# Patient Record
Sex: MALE | Race: BLACK OR AFRICAN AMERICAN | Employment: FULL TIME | URBAN - METROPOLITAN AREA
[De-identification: names, ages, dates, MRNs, and addresses within clinical notes are randomized per-mention and may not be internally consistent; named-entity substitution may affect disease eponyms.]

---

## 2019-06-28 ENCOUNTER — OFFICE VISIT (OUTPATIENT)
Dept: UROLOGY | Age: 59
End: 2019-06-28

## 2019-06-28 ENCOUNTER — HOSPITAL ENCOUNTER (OUTPATIENT)
Dept: LAB | Age: 59
Discharge: HOME | End: 2019-06-28
Payer: COMMERCIAL

## 2019-06-28 VITALS
DIASTOLIC BLOOD PRESSURE: 86 MMHG | OXYGEN SATURATION: 98 % | BODY MASS INDEX: 25.48 KG/M2 | WEIGHT: 172 LBS | SYSTOLIC BLOOD PRESSURE: 129 MMHG | HEART RATE: 67 BPM | HEIGHT: 69 IN

## 2019-06-28 DIAGNOSIS — Z12.5 PROSTATE CANCER SCREENING: ICD-10-CM

## 2019-06-28 DIAGNOSIS — N50.89 SCROTAL SWELLING: Primary | ICD-10-CM

## 2019-06-28 DIAGNOSIS — N50.89 SCROTAL SWELLING: ICD-10-CM

## 2019-06-28 DIAGNOSIS — N50.82 SCROTAL PAIN: ICD-10-CM

## 2019-06-28 LAB
BILIRUB UR QL STRIP: NEGATIVE
GLUCOSE UR-MCNC: NEGATIVE MG/DL
KETONES P FAST UR STRIP-MCNC: NEGATIVE MG/DL
PH UR STRIP: 7 [PH] (ref 4.6–8)
PROT UR QL STRIP: NEGATIVE
PSA SERPL-MCNC: 0.2 NG/ML (ref 0–4)
SP GR UR STRIP: 1.01 (ref 1–1.03)
UA UROBILINOGEN AMB POC: NORMAL (ref 0.2–1)
URINALYSIS CLARITY POC: CLEAR
URINALYSIS COLOR POC: YELLOW
URINE BLOOD POC: NEGATIVE
URINE LEUKOCYTES POC: NEGATIVE
URINE NITRITES POC: NEGATIVE

## 2019-06-28 PROCEDURE — 84153 ASSAY OF PSA TOTAL: CPT

## 2019-06-28 RX ORDER — BISMUTH SUBSALICYLATE 262 MG
1 TABLET,CHEWABLE ORAL DAILY
COMMUNITY

## 2019-06-28 NOTE — PROGRESS NOTES
Neal Olmos    Chief Complaint   Patient presents with    New Patient    Testicle Swelling    Testicle Pain     tenderness with touch       History and Physical    The patient is a pleasant 69-year-old -American male who comes in with a several week history of some changes in the way his scrotum feels. It is not actual pain but his sensation of thickness and tightness and discomfort when he touches the area. There is no dysuria there is no hematuria although the patient has begun to note nocturia x2 progressing over months. The patient denies any preceding injury and has not had any sexual activity outside of his monogamous relationship. Has never had this issue before. The patient is unaware of any prostate history within the family    Past Medical History:   Diagnosis Date    Arthritis      Patient Active Problem List   Diagnosis Code    Osteoarthritis of left knee M17.12     Past Surgical History:   Procedure Laterality Date    HX COLONOSCOPY      HX HERNIA REPAIR      HX ORTHOPAEDIC      left knee partial replacement    HX ORTHOPAEDIC      right knee ACL SX     Current Outpatient Medications   Medication Sig Dispense Refill    multivitamin (ONE A DAY) tablet Take 1 Tab by mouth daily.  enoxaparin (LOVENOX) 40 mg/0.4 mL 0.4 mL by SubCUTAneous route daily. 14 Syringe 0    oxyCODONE-acetaminophen (PERCOCET 7.5) 7.5-325 mg per tablet Take 1-2 Tabs by mouth every four (4) hours as needed. Max Daily Amount: 12 Tabs.  60 Tab 0     Allergies   Allergen Reactions    Bactrim  [Sulfamethoxazole-Trimethoprim] Rash    Pcn [Penicillins] Hives and Rash    Procaine Other (comments)     Causes dizziness     Social History     Socioeconomic History    Marital status: UNKNOWN     Spouse name: Not on file    Number of children: Not on file    Years of education: Not on file    Highest education level: Not on file   Occupational History    Not on file   Social Needs    Financial resource strain: Not on file    Food insecurity:     Worry: Not on file     Inability: Not on file    Transportation needs:     Medical: Not on file     Non-medical: Not on file   Tobacco Use    Smoking status: Never Smoker    Smokeless tobacco: Never Used   Substance and Sexual Activity    Alcohol use: Not Currently     Comment: occas    Drug use: No    Sexual activity: Yes   Lifestyle    Physical activity:     Days per week: Not on file     Minutes per session: Not on file    Stress: Not on file   Relationships    Social connections:     Talks on phone: Not on file     Gets together: Not on file     Attends Rastafari service: Not on file     Active member of club or organization: Not on file     Attends meetings of clubs or organizations: Not on file     Relationship status: Not on file    Intimate partner violence:     Fear of current or ex partner: Not on file     Emotionally abused: Not on file     Physically abused: Not on file     Forced sexual activity: Not on file   Other Topics Concern    Not on file   Social History Narrative    Not on file      Family History   Problem Relation Age of Onset    Cancer Mother     Diabetes Mother     Hypertension Mother                  Visit Vitals  /86 (BP 1 Location: Right arm, BP Patient Position: Sitting)   Pulse 67   Ht 5' 9\" (1.753 m)   Wt 172 lb (78 kg)   SpO2 98%   BMI 25.40 kg/m²     Physical        Gen: [facility] adult NAD  Head  : normocephalic,  Normal ROM; eyes with normal pupils, EOMs, no masses;  conjunctiva normal  Neck: normal movement,  no evident mass,  No evident adenopathy, trachea midline,  Lungs: no respiratory distress or difficulties  CV:  No evident peripheral swelling  Abd :bowel sounds normal, no masses, tenderness, organomegaly  Flanks     -penis is circumcised and normal.  The left scrotum reveals a normal size and texture of testes with a normal epididymis.   The spermatic cord is somewhat thickened on the side but the patient has had a hernia repair on this side. There is no palpable varicocele and no recurrence of hernia. On the right side the testis is of normal size and texture of the epididymis is normal and there feels to be about a 1 cm granuloma near the globus minor on the right side. The cord structures are normal.  Rectal tone is normal.  Prostate gland is 30 g smooth and benign    Extremities- no edema, arthritis, deformity, swelling  Psych- oriented, no evident anxiety, no cognitive impairment evident    There is no PSA in the database  Urine analysis unremarkable            Impression/ PLAN  Nonspecific concerns regarding the scrotum but were going to do an ultrasound as a baseline to reassure the patient. The patient will get a PSA and will return for review of the PSA and the scrotal ultrasound            This visit exceeded  minutes and >50% was counselling  The patient understands the discussion and plan    PLEASE NOTE:      This document has been produced using voice recognition software.   Unrecognized errors in transcription may be present    Sallie Barrett MD

## 2019-06-28 NOTE — PATIENT INSTRUCTIONS
Urine Test: About This Test 
What is it? A urine test checks the color, clarity (clear or cloudy), odor, concentration, and acidity (pH) of your urine. It also checks your levels of protein, sugar, blood cells, or other substances in your urine. This test is sometimes called a urinalysis. Why is this test done? A urine test may be done: · To check for a disease or infection of the urinary tract. The urinary tract includes the kidneys, the tubes that carry urine from the kidneys to the bladder (ureters), and the bladder. It also includes the tube that carries urine from the bladder to outside the body (urethra). · To check the treatment of conditions such as diabetes, kidney stones, a urinary tract infection (UTI), high blood pressure, or some kidney or liver diseases. How can you prepare for the test? 
· Before the test, don't eat foods that can change the color of your urine. Examples of these include blackberries, beets, and rhubarb. · Don't do heavy exercise before the test. 
· Tell your doctor if you are menstruating or close to starting your period. Your doctor may want to wait to do the test. 
· Tell your doctor about all the nonprescription and prescription medicines and herbs or other supplements you take. Some of these can affect the results of this test. 
What happens during the test? 
A urine test can be done in your doctor's office, clinic, or lab. Or you may be asked to collect a urine sample at home. Then you can take it to the office or lab for testing. Clean-catch midstream urine collection · Wash your hands before you start. · If the cup you are given has a lid, remove it carefully. Set it down with the inner surface up. Don't touch the inside of the cup with your fingers. · Clean the area around your genitals. ? For men: Pull back the foreskin, if present. Clean the head of your penis with medicated towelettes or swabs. ? For women: Spread open the genital folds of skin with one hand. Then use medicated towelettes or swabs in your other hand to clean the area where urine comes out (the urethra). Wipe the area from front to back. · Start urinating into the toilet or urinal. A woman should hold apart the genital folds of skin while she urinates. · After the urine has flowed for several seconds, place the cup into the urine stream. Collect about 2 ounces of urine without stopping your flow of urine. · Don't touch the rim of the cup to your genital area. Don't get toilet paper, pubic hair, stool (feces), menstrual blood, or anything else in the urine sample. · Finish urinating into the toilet or urinal. 
· Carefully replace and tighten the lid on the cup, and then return it to the lab. If you are collecting the urine at home and can't get it to the lab in an hour, refrigerate it. Double-voided urine sample collection This method collects the urine your body is making right now. · Urinate into the toilet or urinal. Don't collect any of this urine. · Drink a large glass of water, and wait about 30 to 40 minutes. · Then get a urine sample. Follow the instructions above for collecting a clean-catch urine sample. · Take the urine sample to the lab. If you are collecting the urine at home and can't get it to the lab in an hour, refrigerate it. Follow-up care is a key part of your treatment and safety. Be sure to make and go to all appointments, and call your doctor if you are having problems. It's also a good idea to keep a list of the medicines you take. Ask your doctor when you can expect to have your test results. Where can you learn more? Go to http://louie-edward.info/. Enter R266 in the search box to learn more about \"Urine Test: About This Test.\" Current as of: June 25, 2018 Content Version: 11.9 © 6608-7494 [employer].  Care instructions adapted under license by [address] (which disclaims liability or warranty for this information). If you have questions about a medical condition or this instruction, always ask your healthcare professional. [address] any warranty or liability for your use of this information.

## 2019-06-28 NOTE — PROGRESS NOTES
Mr. Braun Santisanjay has a reminder for a \"due or due soon\" health maintenance. I have asked that he contact his primary care provider for follow-up on this health maintenance.

## 2019-07-25 ENCOUNTER — HOSPITAL ENCOUNTER (OUTPATIENT)
Dept: ULTRASOUND IMAGING | Age: 59
Discharge: HOME | End: 2019-07-25
Attending: UROLOGY
Payer: COMMERCIAL

## 2019-07-25 DIAGNOSIS — N50.89 SCROTAL SWELLING: ICD-10-CM

## 2019-07-25 DIAGNOSIS — N50.82 SCROTAL PAIN: ICD-10-CM

## 2019-07-25 PROCEDURE — 93975 VASCULAR STUDY: CPT

## 2019-07-25 PROCEDURE — 76870 US EXAM SCROTUM: CPT

## 2019-07-26 ENCOUNTER — TELEPHONE (OUTPATIENT)
Dept: UROLOGY | Age: 59
End: 2019-07-26

## 2019-07-26 NOTE — TELEPHONE ENCOUNTER
Patient can't make it to the office. He would like to know if you can give him his tests results over the phone. Please review and advise.       I called patient and advised that psa and ultrasound normal  KEV Jennings